# Patient Record
Sex: MALE | Race: WHITE | Employment: STUDENT | URBAN - METROPOLITAN AREA
[De-identification: names, ages, dates, MRNs, and addresses within clinical notes are randomized per-mention and may not be internally consistent; named-entity substitution may affect disease eponyms.]

---
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
## 2020-11-17 ENCOUNTER — TELEPHONE (OUTPATIENT)
Dept: INTERNAL MEDICINE CLINIC | Facility: CLINIC | Age: 20
End: 2020-11-17

## 2020-11-17 ENCOUNTER — HOSPITAL ENCOUNTER (EMERGENCY)
Facility: HOSPITAL | Age: 20
Discharge: HOME | End: 2020-11-17
Attending: EMERGENCY MEDICINE
Payer: COMMERCIAL

## 2020-11-17 ENCOUNTER — APPOINTMENT (OUTPATIENT)
Dept: CT IMAGING | Facility: HOSPITAL | Age: 20
End: 2020-11-17
Attending: EMERGENCY MEDICINE
Payer: COMMERCIAL

## 2020-11-17 VITALS
DIASTOLIC BLOOD PRESSURE: 76 MMHG | TEMPERATURE: 98 F | OXYGEN SATURATION: 97 % | HEART RATE: 75 BPM | RESPIRATION RATE: 18 BRPM | SYSTOLIC BLOOD PRESSURE: 129 MMHG

## 2020-11-17 DIAGNOSIS — R10.9 ABDOMINAL PAIN OF UNKNOWN ETIOLOGY: Primary | ICD-10-CM

## 2020-11-17 PROCEDURE — 36415 COLL VENOUS BLD VENIPUNCTURE: CPT

## 2020-11-17 PROCEDURE — 99284 EMERGENCY DEPT VISIT MOD MDM: CPT

## 2020-11-17 PROCEDURE — 85025 COMPLETE CBC W/AUTO DIFF WBC: CPT | Performed by: EMERGENCY MEDICINE

## 2020-11-17 PROCEDURE — 80053 COMPREHEN METABOLIC PANEL: CPT | Performed by: EMERGENCY MEDICINE

## 2020-11-17 PROCEDURE — 74177 CT ABD & PELVIS W/CONTRAST: CPT | Performed by: EMERGENCY MEDICINE

## 2020-11-17 PROCEDURE — 83690 ASSAY OF LIPASE: CPT | Performed by: EMERGENCY MEDICINE

## 2020-11-17 PROCEDURE — 86140 C-REACTIVE PROTEIN: CPT | Performed by: EMERGENCY MEDICINE

## 2020-11-17 PROCEDURE — 85652 RBC SED RATE AUTOMATED: CPT | Performed by: EMERGENCY MEDICINE

## 2020-11-17 NOTE — TELEPHONE ENCOUNTER
Patient calling to schedule with Dr Lisa Fuchs for ongoing left side abdomen pain that is not going away; possibly kidney stone?  Offered same day with different provider patient declined; please call to triage further if waiting until later in week to be seen i

## 2020-11-17 NOTE — TELEPHONE ENCOUNTER
Spoke with patient stating has had left lower abdominal pain in the past, he was seen at [facility] and then followed up with [facility], CT scan of abdomen was completed at that time.  Patient states his left lower abdominal pain has now been ongoing for the past 2 months

## 2020-11-17 NOTE — ED PROVIDER NOTES
Patient Seen in: [facility] Emergency Department      History   Patient presents with:  Abdomen/Flank Pain    Stated Complaint: left side pain     HPI    This is a 30-year-old male complaining of intermittent left-sided abdominal pain for approximat above.    Physical Exam     ED Triage Vitals [11/17/20 1633]   /76   Pulse 75   Resp 18   Temp 97.6 °F (36.4 °C)   Temp src Temporal   SpO2 97 %   O2 Device None (Room air)       Current:/76   Pulse 75   Temp 97.6 °F (36.4 °C) (Temporal)   Resp -----------         ------                     CBC W/ DIFFERENTIAL[[id number]]                              Final result                 Please view results for these tests on the individual orders.    CBC W/ DIFFERENTIAL       Ct CONCLUSION:  No acute abdominal pelvic process.    Dictated by (CST): Mercy Dyer MD on 11/17/2020 at 5:21 PM     Finalized by (CST): Mercy Dyer MD on 11/17/2020 at 5:33 PM         The patient's history and physical evaluation is consistent with c

## 2020-11-17 NOTE — TELEPHONE ENCOUNTER
Spoke with patient advised to proceed to ER for further evaluation. Patient verbalized understanding and agreeable to POC.

## 2021-05-03 ENCOUNTER — OFFICE VISIT (OUTPATIENT)
Dept: INTERNAL MEDICINE CLINIC | Facility: CLINIC | Age: 21
End: 2021-05-03
Payer: COMMERCIAL

## 2021-05-03 VITALS
TEMPERATURE: 100 F | OXYGEN SATURATION: 99 % | DIASTOLIC BLOOD PRESSURE: 60 MMHG | RESPIRATION RATE: 16 BRPM | HEIGHT: 69 IN | SYSTOLIC BLOOD PRESSURE: 122 MMHG | WEIGHT: 147.25 LBS | BODY MASS INDEX: 21.81 KG/M2 | HEART RATE: 87 BPM

## 2021-05-03 DIAGNOSIS — K64.9 HEMORRHOIDS, UNSPECIFIED HEMORRHOID TYPE: ICD-10-CM

## 2021-05-03 DIAGNOSIS — R35.0 URINE FREQUENCY: Primary | ICD-10-CM

## 2021-05-03 PROCEDURE — 87491 CHLMYD TRACH DNA AMP PROBE: CPT | Performed by: FAMILY MEDICINE

## 2021-05-03 PROCEDURE — 3078F DIAST BP <80 MM HG: CPT | Performed by: FAMILY MEDICINE

## 2021-05-03 PROCEDURE — 3074F SYST BP LT 130 MM HG: CPT | Performed by: FAMILY MEDICINE

## 2021-05-03 PROCEDURE — 99214 OFFICE O/P EST MOD 30 MIN: CPT | Performed by: FAMILY MEDICINE

## 2021-05-03 PROCEDURE — 87591 N.GONORRHOEAE DNA AMP PROB: CPT | Performed by: FAMILY MEDICINE

## 2021-05-03 PROCEDURE — 3008F BODY MASS INDEX DOCD: CPT | Performed by: FAMILY MEDICINE

## 2021-05-03 PROCEDURE — 81003 URINALYSIS AUTO W/O SCOPE: CPT | Performed by: FAMILY MEDICINE

## 2021-05-03 NOTE — PROGRESS NOTES
Arnoldo Cuevas is a 24year old male.   HPI:   Here to go over few issues   Last month had a hard time urinating as it was blocked    Did get better but still has urgency    Urine is clear to ylw       Was told had stones in hte past     Able to sleep at ni

## 2021-05-10 ENCOUNTER — TELEPHONE (OUTPATIENT)
Dept: INTERNAL MEDICINE CLINIC | Facility: CLINIC | Age: 21
End: 2021-05-10

## 2021-05-10 NOTE — TELEPHONE ENCOUNTER
Pt called to get status on urine test results. Advd results were sent via [employer]. Pt reji he still wanted to speak with a nurse. Then pt reji he would call us back after logging into his mychart if he had additional questions.

## 2022-08-15 ENCOUNTER — APPOINTMENT (OUTPATIENT)
Dept: ULTRASOUND IMAGING | Age: 22
End: 2022-08-15
Attending: EMERGENCY MEDICINE
Payer: COMMERCIAL

## 2022-08-15 ENCOUNTER — HOSPITAL ENCOUNTER (OUTPATIENT)
Age: 22
Discharge: HOME | End: 2022-08-15
Attending: EMERGENCY MEDICINE
Payer: COMMERCIAL

## 2022-08-15 VITALS
OXYGEN SATURATION: 99 % | TEMPERATURE: 98 F | RESPIRATION RATE: 16 BRPM | DIASTOLIC BLOOD PRESSURE: 76 MMHG | HEART RATE: 88 BPM | SYSTOLIC BLOOD PRESSURE: 123 MMHG

## 2022-08-15 DIAGNOSIS — N50.811 TESTICULAR PAIN, RIGHT: Primary | ICD-10-CM

## 2022-08-15 LAB
POCT BILIRUBIN URINE: NEGATIVE
POCT GLUCOSE URINE: NEGATIVE MG/DL
POCT KETONE URINE: NEGATIVE MG/DL
POCT LEUKOCYTE ESTERASE URINE: NEGATIVE
POCT NITRITE URINE: NEGATIVE
POCT PH URINE: 7.5 (ref 5–8)
POCT PROTEIN URINE: NEGATIVE MG/DL
POCT SPECIFIC GRAVITY URINE: 1.02
POCT URINE CLARITY: CLEAR
POCT URINE COLOR: YELLOW
POCT UROBILINOGEN URINE: 0.2 MG/DL

## 2022-08-15 PROCEDURE — 81002 URINALYSIS NONAUTO W/O SCOPE: CPT | Performed by: EMERGENCY MEDICINE

## 2022-08-15 PROCEDURE — 76870 US EXAM SCROTUM: CPT | Performed by: EMERGENCY MEDICINE

## 2022-08-15 PROCEDURE — 93975 VASCULAR STUDY: CPT | Performed by: EMERGENCY MEDICINE

## 2022-08-15 PROCEDURE — 87591 N.GONORRHOEAE DNA AMP PROB: CPT | Performed by: EMERGENCY MEDICINE

## 2022-08-15 PROCEDURE — 99213 OFFICE O/P EST LOW 20 MIN: CPT

## 2022-08-15 PROCEDURE — 87491 CHLMYD TRACH DNA AMP PROBE: CPT | Performed by: EMERGENCY MEDICINE

## 2022-08-15 PROCEDURE — 99214 OFFICE O/P EST MOD 30 MIN: CPT

## 2022-08-15 NOTE — ED INITIAL ASSESSMENT (HPI)
For about 2 months off and on pt with \"rising\" R testicle - seems like there is a bump above testicle when this happens and testicle does not descend properly; pt states when he was a child he had surgery for undescended L testicle; \"semen thicker than normal\"    No swelling or discoloration or discharge

## 2022-08-16 ENCOUNTER — OFFICE VISIT (OUTPATIENT)
Dept: SURGERY | Facility: CLINIC | Age: 22
End: 2022-08-16
Payer: COMMERCIAL

## 2022-08-16 DIAGNOSIS — N50.811 PAIN IN RIGHT TESTICLE: ICD-10-CM

## 2022-08-16 DIAGNOSIS — K59.00 CONSTIPATION, UNSPECIFIED CONSTIPATION TYPE: ICD-10-CM

## 2022-08-16 DIAGNOSIS — R82.90 URINE FINDING: Primary | ICD-10-CM

## 2022-08-16 DIAGNOSIS — R39.12 WEAK URINARY STREAM: ICD-10-CM

## 2022-08-16 LAB
APPEARANCE: CLEAR
BILIRUBIN: NEGATIVE
C TRACH DNA SPEC QL NAA+PROBE: NEGATIVE
GLUCOSE (URINE DIPSTICK): NEGATIVE MG/DL
KETONES (URINE DIPSTICK): NEGATIVE MG/DL
LEUKOCYTES: NEGATIVE
MULTISTIX LOT#: NORMAL NUMERIC
N GONORRHOEA DNA SPEC QL NAA+PROBE: NEGATIVE
NITRITE, URINE: NEGATIVE
OCCULT BLOOD: NEGATIVE
PH, URINE: 6 (ref 4.5–8)
PROTEIN (URINE DIPSTICK): NEGATIVE MG/DL
SPECIFIC GRAVITY: 1.03 (ref 1–1.03)
URINE-COLOR: YELLOW
UROBILINOGEN,SEMI-QN: 0.2 MG/DL (ref 0–1.9)

## 2022-08-16 PROCEDURE — 99204 OFFICE O/P NEW MOD 45 MIN: CPT | Performed by: UROLOGY

## 2022-08-16 PROCEDURE — 81003 URINALYSIS AUTO W/O SCOPE: CPT | Performed by: UROLOGY

## 2024-01-25 ENCOUNTER — HOSPITAL ENCOUNTER (OUTPATIENT)
Age: 24
Discharge: HOME | End: 2024-01-25
Payer: COMMERCIAL

## 2024-01-25 ENCOUNTER — APPOINTMENT (OUTPATIENT)
Dept: GENERAL RADIOLOGY | Age: 24
End: 2024-01-25
Attending: PHYSICIAN ASSISTANT
Payer: COMMERCIAL

## 2024-01-25 VITALS
TEMPERATURE: 99 F | WEIGHT: 160 LBS | HEIGHT: 70 IN | OXYGEN SATURATION: 96 % | BODY MASS INDEX: 22.9 KG/M2 | HEART RATE: 81 BPM | RESPIRATION RATE: 16 BRPM | DIASTOLIC BLOOD PRESSURE: 73 MMHG | SYSTOLIC BLOOD PRESSURE: 121 MMHG

## 2024-01-25 DIAGNOSIS — F17.200 HEAVY SMOKER: ICD-10-CM

## 2024-01-25 DIAGNOSIS — R06.09 DYSPNEA ON EXERTION: Primary | ICD-10-CM

## 2024-01-25 DIAGNOSIS — J01.40 ACUTE PANSINUSITIS, RECURRENCE NOT SPECIFIED: ICD-10-CM

## 2024-01-25 LAB
#MXD IC: 0.4 X10ˆ3/UL (ref 0.1–1)
BUN BLD-MCNC: 15 MG/DL (ref 7–18)
CHLORIDE BLD-SCNC: 100 MMOL/L (ref 98–112)
CO2 BLD-SCNC: 25 MMOL/L (ref 21–32)
CREAT BLD-MCNC: 1 MG/DL
DDIMER WHOLE BLOOD: <200 NG/ML DDU (ref ?–400)
EGFRCR SERPLBLD CKD-EPI 2021: 108 ML/MIN/1.73M2 (ref 60–?)
GLUCOSE BLD-MCNC: 95 MG/DL (ref 70–99)
HCT VFR BLD AUTO: 42.9 %
HCT VFR BLD CALC: 43 %
HGB BLD-MCNC: 14.5 G/DL
ISTAT IONIZED CALCIUM FOR CHEM 8: 1.22 MMOL/L (ref 1.12–1.32)
LYMPHOCYTES # BLD AUTO: 2.3 X10ˆ3/UL (ref 1–4)
LYMPHOCYTES NFR BLD AUTO: 34.8 %
MCH RBC QN AUTO: 29.2 PG (ref 26–34)
MCHC RBC AUTO-ENTMCNC: 33.8 G/DL (ref 31–37)
MCV RBC AUTO: 86.3 FL (ref 80–100)
MIXED CELL %: 6.5 %
NEUTROPHILS # BLD AUTO: 4 X10ˆ3/UL (ref 1.5–7.7)
NEUTROPHILS NFR BLD AUTO: 58.7 %
PLATELET # BLD AUTO: 225 X10ˆ3/UL (ref 150–450)
POTASSIUM BLD-SCNC: 3.6 MMOL/L (ref 3.6–5.1)
RBC # BLD AUTO: 4.97 X10ˆ6/UL
SODIUM BLD-SCNC: 138 MMOL/L (ref 136–145)
TROPONIN I BLD-MCNC: <0.02 NG/ML
WBC # BLD AUTO: 6.7 X10ˆ3/UL (ref 4–11)

## 2024-01-25 PROCEDURE — 80047 BASIC METABLC PNL IONIZED CA: CPT

## 2024-01-25 PROCEDURE — 94640 AIRWAY INHALATION TREATMENT: CPT

## 2024-01-25 PROCEDURE — 84484 ASSAY OF TROPONIN QUANT: CPT

## 2024-01-25 PROCEDURE — 71046 X-RAY EXAM CHEST 2 VIEWS: CPT | Performed by: PHYSICIAN ASSISTANT

## 2024-01-25 PROCEDURE — 93010 ELECTROCARDIOGRAM REPORT: CPT

## 2024-01-25 PROCEDURE — 85378 FIBRIN DEGRADE SEMIQUANT: CPT | Performed by: PHYSICIAN ASSISTANT

## 2024-01-25 PROCEDURE — 93005 ELECTROCARDIOGRAM TRACING: CPT

## 2024-01-25 PROCEDURE — 85025 COMPLETE CBC W/AUTO DIFF WBC: CPT | Performed by: PHYSICIAN ASSISTANT

## 2024-01-25 PROCEDURE — 99215 OFFICE O/P EST HI 40 MIN: CPT

## 2024-01-25 PROCEDURE — 36415 COLL VENOUS BLD VENIPUNCTURE: CPT

## 2024-01-25 RX ORDER — AMOXICILLIN 875 MG/1
875 TABLET, COATED ORAL 2 TIMES DAILY
Qty: 20 TABLET | Refills: 0 | Status: SHIPPED | OUTPATIENT
Start: 2024-01-25 | End: 2024-02-04

## 2024-01-25 RX ORDER — PREDNISONE 20 MG/1
40 TABLET ORAL DAILY
Qty: 8 TABLET | Refills: 0 | Status: SHIPPED | OUTPATIENT
Start: 2024-01-25 | End: 2024-01-29

## 2024-01-25 RX ORDER — ALBUTEROL SULFATE 90 UG/1
2 AEROSOL, METERED RESPIRATORY (INHALATION) ONCE
Status: COMPLETED | OUTPATIENT
Start: 2024-01-25 | End: 2024-01-25

## 2024-01-25 NOTE — ED PROVIDER NOTES
Patient Seen in: [facility]      History     Chief Complaint   Patient presents with    Cough/URI     Stated Complaint: Cough/URI, Ear Issue, Eye Issue    Subjective:   HPI    23-year-old male here with complaint of sinus pain and pressure for the past month.  Patient reports in the past year he has been having some dyspnea on exertion.  Patient is a heavy smoker both cigarettes and marijuana and vaping.  Patient denies chest pain at this time but has had intermittent chest discomfort with deep breaths.  Patient denies abdominal pain, nausea, vomiting or diarrhea.  Patient is tolerating p.o. speaking full sentences.  Afebrile.    Objective:   History reviewed. No pertinent past medical history.           Past Surgical History:   Procedure Laterality Date    OTHER SURGICAL HISTORY      left undescended testes sx, lowered         11yr age       OTHER SURGICAL HISTORY      hemorrhoid clot removed                The patient's medication list, past medical history and social history elements  as listed in today's nurse's notes are reviewed and agree.   The patient's family history is reviewed and is noncontributory to the presenting problem, except as indicated as above.     Social History     Socioeconomic History    Marital status: Single   Tobacco Use    Smoking status: Every Day     Types: Cigarettes    Smokeless tobacco: Never    Tobacco comments:     Smokes 4 cigs per day   Vaping Use    Vaping Use: Every day    Devices: Disposable   Substance and Sexual Activity    Alcohol use: Yes     Comment: on the weekends    Drug use: Yes     Types: Cannabis    Sexual activity: Yes     Partners: Female     Birth control/protection: Condom   Other Topics Concern    Caffeine Concern Yes     Comment: Drinks a lot of pop, sometimes coffee and 5-hour Energy.      Exercise No              Review of Systems    Positive for stated complaint: Cough/URI, Ear Issue, Eye Issue  Other systems are as noted in  HPI.  Constitutional and vital signs reviewed.      All other systems reviewed and negative except as noted above.    Physical Exam     ED Triage Vitals [01/25/24 1604]   /73   Pulse 81   Resp 16   Temp 98.5 °F (36.9 °C)   Temp src Temporal   SpO2 96 %   O2 Device None (Room air)       Current:/73   Pulse 81   Temp 98.5 °F (36.9 °C) (Temporal)   Resp 16   Ht 177.8 cm (5' 10\")   Wt 72.6 kg   SpO2 96%   BMI 22.96 kg/m²         Physical Exam  Vitals and nursing note reviewed.   Constitutional:       Appearance: Normal appearance. He is well-developed.   HENT:      Head: Normocephalic.      Jaw: There is normal jaw occlusion.      Right Ear: External ear normal. Tympanic membrane is bulging.      Left Ear: External ear normal. Tympanic membrane is bulging.      Nose: Mucosal edema, congestion and rhinorrhea present. Rhinorrhea is clear and purulent.      Right Sinus: Maxillary sinus tenderness and frontal sinus tenderness present.      Left Sinus: Maxillary sinus tenderness and frontal sinus tenderness present.      Mouth/Throat:      Lips: Pink.      Mouth: Mucous membranes are moist.      Pharynx: Oropharynx is clear.      Comments: Uvula midline: No trismus or drooling: No peritonsillar abscess noted moderate cobblestoning of the posterior pharynx.  Eyes:      Conjunctiva/sclera: Conjunctivae normal.      Pupils: Pupils are equal, round, and reactive to light.   Cardiovascular:      Rate and Rhythm: Normal rate and regular rhythm.      Heart sounds: Normal heart sounds.   Pulmonary:      Effort: Pulmonary effort is normal.      Breath sounds: Wheezing present.   Musculoskeletal:      Cervical back: Normal range of motion and neck supple.   Skin:     General: Skin is warm.      Capillary Refill: Capillary refill takes less than 2 seconds.   Neurological:      General: No focal deficit present.      Mental Status: He is alert and oriented to person, place, and time.   Psychiatric:         Mood and  Affect: Mood normal.         Behavior: Behavior normal.         Thought Content: Thought content normal.         Judgment: Judgment normal.             ED Course     Labs Reviewed   D-DIMER (POC) - Normal   POCT ISTAT CHEM8 CARTRIDGE - Normal   ISTAT TROPONIN - Normal   POCT CBC     EKG    Rate, intervals and axes as noted on EKG Report.  Rate: 63 BPM  Rhythm: normal sinus rhythm  Reading: Incomplete RBBB:borderline EKG           I personally reviewed the xray images and and saw these findings: no acute findings  XR CHEST PA + LAT CHEST (CPT=71046)    Result Date: 1/25/2024  PROCEDURE:  XR CHEST PA + LAT CHEST (CPT=71046)  INDICATIONS:  Cough/URI, Ear Issue, Eye Issue  COMPARISON:  None.  TECHNIQUE:  PA and lateral chest radiographs were obtained.  PATIENT STATED HISTORY: (As transcribed by Technologist)  Patient states chest tightness, SOB and ear pressure for the past past month and a half since sinus infection that has cleared. Current smoker.               CONCLUSION:   Normal cardiac and mediastinal contours.  No pulmonary edema or focal airspace consolidation.  The pleural spaces are clear.  Regional osseous structures are normal.    LOCATION:  [facility]   Dictated by (CST): Karena Robledo MD on 1/25/2024 at 4:44 PM     Finalized by (CST): Karena Robledo MD on 1/25/2024 at 4:44 PM             NOTE: We discussed the fact that with his vaping marijuana and cigarette smoking simultaneously etc. it is attributing to his issues.  Discussed follow-up with his PCP to start a regime to help stop smoking i.e. Chantix etc.  Patient is amenable.         MDM       Clinical Impression: dyspnea on exertion/sinusitis/heavy smoker  Course of Treatment:     The Decadron will work in your system the next several days.  You may start the additional prednisone on day 2 or 3 if symptoms persist.  Take the full course antibiotics as prescribed in tandem with a probiotic daily.  Use your inhaler every 4-6 hours as needed.  Recommend taking  an over the counter antihistamine daily: IE zyrtec/claritin.  Sleep more upright. Use chloraseptic spray to help stop the cough trigger reflex.  Push fluids and gargle with warm saline rinses.   Work on smoking cessation as discussed.  If symptoms persist or worsen i.e. sustained shortness of breath chest discomfort etc. dial 911 and go directly to the emergency room.  Otherwise close follow-up with your primary care physician for further evaluation and treatment.  This case was discussed with the attending physician please see the attestation.     The patient is encouraged to return if any concerning symptoms arise. Additional verbal discharge instructions are given and discussed. Discharge medications are discussed. The patient is in good condition throughout the visit today and remains so upon discharge. I discuss the plan of care with the patient, who expresses understanding. All questions and concerns are addressed to the patient's satisfaction prior to discharge today.  Previous conversations with PCP and charts were reviewed.                                       Disposition and Plan     Clinical Impression:  1. Dyspnea on exertion    2. Acute pansinusitis, recurrence not specified    3. Heavy smoker         Disposition:  Discharge  1/25/2024  5:17 pm    Follow-up:  Saud Barbosa MD  [address] [address]  [phone]                Medications Prescribed:  Current Discharge Medication List        START taking these medications    Details   amoxicillin 875 MG Oral Tab Take 1 tablet (875 mg total) by mouth 2 (two) times daily for 10 days.  Qty: 20 tablet, Refills: 0      predniSONE 20 MG Oral Tab Take 2 tablets (40 mg total) by mouth daily for 4 days. Start on day 2-3 if symptoms persist  Qty: 8 tablet, Refills: 0

## 2024-01-25 NOTE — DISCHARGE INSTRUCTIONS
Please return to the ER/clinic if symptoms worsen. Follow-up with your PCP in 24-48 hours as needed.      The Decadron will work in your system the next several days.  You may start the additional prednisone on day 2 or 3 if symptoms persist.  Take the full course antibiotics as prescribed in tandem with a probiotic daily.  Use your inhaler every 4-6 hours as needed.  Recommend taking an over the counter antihistamine daily: IE zyrtec/claritin.  Sleep more upright. Use chloraseptic spray to help stop the cough trigger reflex.  Push fluids and gargle with warm saline rinses.   Work on smoking cessation as discussed.  If symptoms persist or worsen i.e. sustained shortness of breath chest discomfort etc. dial 911 and go directly to the emergency room.  Otherwise close follow-up with your primary care physician for further evaluation and treatment.

## 2024-01-26 LAB
ATRIAL RATE: 63 BPM
P AXIS: 65 DEGREES
P-R INTERVAL: 150 MS
Q-T INTERVAL: 358 MS
QRS DURATION: 104 MS
QTC CALCULATION (BEZET): 366 MS
R AXIS: 52 DEGREES
T AXIS: 39 DEGREES
VENTRICULAR RATE: 63 BPM

## 2024-06-06 ENCOUNTER — APPOINTMENT (OUTPATIENT)
Dept: GENERAL RADIOLOGY | Age: 24
End: 2024-06-06
Attending: EMERGENCY MEDICINE
Payer: COMMERCIAL

## 2024-06-06 ENCOUNTER — HOSPITAL ENCOUNTER (OUTPATIENT)
Age: 24
Discharge: HOME | End: 2024-06-06
Attending: EMERGENCY MEDICINE
Payer: COMMERCIAL

## 2024-06-06 VITALS
TEMPERATURE: 98 F | HEART RATE: 73 BPM | SYSTOLIC BLOOD PRESSURE: 131 MMHG | OXYGEN SATURATION: 97 % | DIASTOLIC BLOOD PRESSURE: 72 MMHG | RESPIRATION RATE: 16 BRPM

## 2024-06-06 DIAGNOSIS — K30 INDIGESTION: Primary | ICD-10-CM

## 2024-06-06 DIAGNOSIS — K59.00 CONSTIPATION, UNSPECIFIED CONSTIPATION TYPE: ICD-10-CM

## 2024-06-06 LAB
BILIRUB UR QL STRIP: NEGATIVE
CLARITY UR: CLEAR
COLOR UR: YELLOW
GLUCOSE UR STRIP-MCNC: NEGATIVE MG/DL
HGB UR QL STRIP: NEGATIVE
KETONES UR STRIP-MCNC: NEGATIVE MG/DL
LEUKOCYTE ESTERASE UR QL STRIP: NEGATIVE
NITRITE UR QL STRIP: NEGATIVE
PH UR STRIP: 6 [PH]
SP GR UR STRIP: >=1.03
UROBILINOGEN UR STRIP-ACNC: <2 MG/DL

## 2024-06-06 PROCEDURE — 74021 RADEX ABDOMEN 3+ VIEWS: CPT | Performed by: EMERGENCY MEDICINE

## 2024-06-06 PROCEDURE — 99213 OFFICE O/P EST LOW 20 MIN: CPT

## 2024-06-06 PROCEDURE — 81002 URINALYSIS NONAUTO W/O SCOPE: CPT

## 2024-06-06 NOTE — ED PROVIDER NOTES
Patient reflex  Patient Seen in: [facility]      History     Chief Complaint   Patient presents with    Abdominal Pain    Chest Pain    Urinary Symptoms     Stated Complaint: abdominal pain    Subjective:   HPI    For the past 2 weeks patient has been having intermittent left lower quadrant pain, sometimes back pain, says that his cremasteric reflex is been very strong on the right side, feels he has pelvic floor issues, foul-smelling urine at times.  Objective:   History reviewed. No pertinent past medical history.           Past Surgical History:   Procedure Laterality Date    Other surgical history      left undescended testes sx, lowered         11yr age       Other surgical history      hemorrhoid clot removed                  Social History     Socioeconomic History    Marital status: Single   Tobacco Use    Smoking status: Every Day     Types: Cigarettes    Smokeless tobacco: Never    Tobacco comments:     Smokes 4 cigs per day   Vaping Use    Vaping status: Every Day    Devices: Disposable   Substance and Sexual Activity    Alcohol use: Yes     Comment: on the weekends    Drug use: Yes     Types: Cannabis    Sexual activity: Yes     Partners: Female     Birth control/protection: Condom   Other Topics Concern    Caffeine Concern Yes     Comment: Drinks a lot of pop, sometimes coffee and 5-hour Energy.      Exercise No     Social Determinants of Health      Received from [facility]              Review of Systems    Positive for stated complaint: abdominal pain  Other systems are as noted in HPI.  Constitutional and vital signs reviewed.      All other systems reviewed and negative except as noted above.    Physical Exam     ED Triage Vitals [06/06/24 1052]   /72   Pulse 73   Resp 16   Temp 98 °F (36.7 °C)   Temp src Temporal   SpO2 97 %   O2 Device None (Room air)       Current Vitals:   Vital Signs  BP: 131/72  Pulse: 73  Resp: 16  Temp: 98 °F (36.7 °C)  Temp src:  Temporal    Oxygen Therapy  SpO2: 97 %  O2 Device: None (Room air)            Physical Exam    General: Well-developed, well-nourished, comfortable, no acute distress  Head and neck: Normocephalic, atraumatic  Cardiovascular: Regular rate and rhythm, normal S1 and S2, no obvious murmurs, rubs, or gallops   Lungs: Clear to auscultation bilaterally with equal breath sounds, no wheezing, no rhonchi   Abdomen: Positive bowel sounds,  soft, no tenderness, no distension, no rebound, no guarding   Extremities: No cyanosis, no clubbing, no edema   Musculoskeletal: No tenderness, swelling, deformity   Skin: No significant lesions   Neuro: Grossly intact to patient's baseline  : Exam deferred per patient's wishes    ED Course     Labs Reviewed   [facility] POCT URINALYSIS DIPSTICK - Abnormal; Notable for the following components:       Result Value    Protein urine Trace (*)     All other components within normal limits             Obstructive series -no obstruction.  Stool in ascending colon         MDM      24-year-old no significant past medical history says for the past 2 weeks he has been having intermittent left lower quadrant pain, change in urination, strong cremasteric reflex, pelvic floor issues, poor digestion, stress at work,.  Patient admits he has never eaten any vegetables for most of his life.  He eats [employer] quite often.  Urine dip and obstructive series are reassuring.   We discussed lifestyle modifications.  Patient does not appear to have any emergent issues at this point.  Aftercare instructions reviewed all questions answered                                 Medical Decision Making      Disposition and Plan     Clinical Impression:  1. Indigestion    2. Constipation, unspecified constipation type         Disposition:  Discharge  6/6/2024 12:34 pm    Follow-up:  Saud Barbosa MD  [address] [facility] [address]  [facility] [address]  [phone]                Medications Prescribed:  There are no discharge  medications for this patient.

## 2024-06-06 NOTE — ED INITIAL ASSESSMENT (HPI)
C/o left abdominal pain for 2 weeks, bloating and constipated. A lot of ingestion. Frequency urination for more than 2 weeks. Tightness in the chest when breathing 2-3 weeks. Sinus infection 2 months ago complete antibiotic but not completely better. Left chest pain this morning after eating lasted 2-3 minutes after

## 2024-06-06 NOTE — DISCHARGE INSTRUCTIONS
Drink lots of water.  Try to start eating lots of high-fiber foods - vegetables, fruits, whole grains.  Avoid sugars and starches  Avoid processed foods  Consider a fiber supplement.  Use an over-the-counter laxative like magnesium hydroxide or MiraLAX as needed.  These are only a temporary fix.  You start eating healthy.  Avoid cigarettes, vaping, excess alcohol exposure  Return for any problems

## 2024-06-12 ENCOUNTER — PATIENT MESSAGE (OUTPATIENT)
Dept: INTERNAL MEDICINE CLINIC | Facility: CLINIC | Age: 24
End: 2024-06-12

## 2024-06-12 NOTE — TELEPHONE ENCOUNTER
From: Gbariel Toscano  Sent: 6/12/2024 10:08 AM CDT  To: [facility] Clinical Staff  Subject: Insurance    Okay

## 2024-06-20 ENCOUNTER — OFFICE VISIT (OUTPATIENT)
Dept: INTERNAL MEDICINE CLINIC | Facility: CLINIC | Age: 24
End: 2024-06-20

## 2024-06-20 VITALS
BODY MASS INDEX: 21.72 KG/M2 | HEART RATE: 95 BPM | WEIGHT: 146.63 LBS | SYSTOLIC BLOOD PRESSURE: 122 MMHG | OXYGEN SATURATION: 99 % | TEMPERATURE: 98 F | DIASTOLIC BLOOD PRESSURE: 80 MMHG | HEIGHT: 69 IN

## 2024-06-20 DIAGNOSIS — F17.200 SMOKER: ICD-10-CM

## 2024-06-20 DIAGNOSIS — F41.0 PANIC ATTACK: ICD-10-CM

## 2024-06-20 DIAGNOSIS — R10.32 LLQ PAIN: ICD-10-CM

## 2024-06-20 DIAGNOSIS — Z00.00 LABORATORY EXAM ORDERED AS PART OF ROUTINE GENERAL MEDICAL EXAMINATION: ICD-10-CM

## 2024-06-20 DIAGNOSIS — Z00.00 WELLNESS EXAMINATION: Primary | ICD-10-CM

## 2024-06-20 PROBLEM — K21.9 GERD (GASTROESOPHAGEAL REFLUX DISEASE): Status: ACTIVE | Noted: 2024-06-20

## 2024-06-20 PROCEDURE — 99202 OFFICE O/P NEW SF 15 MIN: CPT | Performed by: FAMILY MEDICINE

## 2024-06-20 PROCEDURE — 99385 PREV VISIT NEW AGE 18-39: CPT | Performed by: FAMILY MEDICINE

## 2024-06-20 NOTE — PROGRESS NOTES
Gabriel Toscano is a 24 year old male who presents for a complete physical exam.   HPI:       Wt Readings from Last 6 Encounters:   06/20/24 146 lb 9.6 oz (66.5 kg)   01/25/24 160 lb (72.6 kg)   05/03/21 147 lb 4 oz (66.8 kg)   09/30/19 142 lb (64.4 kg) (29%, Z= -0.55)*   08/19/19 130 lb (59 kg) (12%, Z= -1.16)*   01/21/19 129 lb (58.5 kg) (13%, Z= -1.12)*     * Growth percentiles are based on CDC (Boys, 2-20 Years) data.     Body mass index is 21.65 kg/m².     AST (U/L)   Date Value   11/17/2020 16     ALT (U/L)   Date Value   11/17/2020 20      No current outpatient medications on file.      No past medical history on file.   Past Surgical History:   Procedure Laterality Date    Other surgical history      left undescended testes sx, lowered         11yr age       Other surgical history      hemorrhoid clot removed        No family history on file.   Social History:  Social History     Socioeconomic History    Marital status: Single   Tobacco Use    Smoking status: Every Day     Types: Cigarettes    Smokeless tobacco: Never    Tobacco comments:     Smokes 4 cigs per day   Vaping Use    Vaping status: Every Day    Devices: Disposable   Substance and Sexual Activity    Alcohol use: Yes     Comment: on the weekends    Drug use: Yes     Types: Cannabis    Sexual activity: Yes     Partners: Female     Birth control/protection: Condom   Other Topics Concern    Caffeine Concern Yes     Comment: Drinks a lot of pop, sometimes coffee and 5-hour Energy.      Exercise No     Social Determinants of Health      Received from [facility]      .        REVIEW OF SYSTEMS:   GENERAL: feels well otherwise  SKIN: denies rash  HEENT: denies nasal congestion, sinus pain or ST  LUNGS: denies shortness of breath   does smoke cig and weed      CARDIOVASCULAR: denies chest pain on exertion  GI: denies abdominal pain   was in ER few d ago for LLQ pain    had xray   advised to use fiber for constipation      : denies  dysuria  NEURO: denies headaches  PSYCHE: getting  in Oct    stress w work, finances    had lost touch w God so coped w week,      was thinking about dieing but he is happy w his dog and fiance     had a panic attack 1 week   heart was racing , like going to break sown   sweating   hard to breathe    got better after 20min to his norm      is OK now    willing to see therpy     HEMATOLOGIC: denies hx of anemia  ENDOCRINE: denies thyroid history  ALL/ASTHMA: denies asthma    EXAM:   /80 (BP Location: Left arm, Patient Position: Sitting, Cuff Size: adult)   Pulse 95   Temp 97.8 °F (36.6 °C) (Temporal)   Ht 5' 9\" (1.753 m)   Wt 146 lb 9.6 oz (66.5 kg)   SpO2 99%   BMI 21.65 kg/m²   Body mass index is 21.65 kg/m².   GENERAL: well developed, well nourished,in no apparent distress  SKIN: no rashes,  HEENT: atraumatic, normocephalic,ears and throat are clear  NECK: supple,no adenopathy  LUNGS: clear to auscultation  CARDIO: RRR without murmur  GI: good BS's,no masses, HSM or tenderness  : two descended testes,no masses,no hernia,no penile lesions  EXTREMITIES: no edema  NEURO: motor and sensory are grossly intact    ASSESSMENT AND PLAN:   Gabriel Toscano is a 24 year old male who presents for a complete physical exam.  Health maintenance, will check fasting Lipids, CMP, CBC and UA.  Discussed smoking    discussed panic dx and options to treat   referral to [facility] ordered      consider meds      discussed consipation     agree w fiber    reviewed ER noted    to check on vaccines     The patient indicates understanding of these issues and agrees to the plan.  The patient is asked to return for CPX in 1yr.

## 2024-06-26 ENCOUNTER — TELEPHONE (OUTPATIENT)
Dept: INTERNAL MEDICINE CLINIC | Facility: CLINIC | Age: 24
End: 2024-06-26

## 2024-06-26 NOTE — TELEPHONE ENCOUNTER
Patient called to check on update. States at last appointment with Dr. Barbosa on 6/20/24 he was informed he would receive a call from [facility]. Patient states he did call and leave a voicemail but has not gotten a call back. Patient is looking to schedule appointment.   Please call and advise.

## 2024-07-04 ENCOUNTER — HOSPITAL ENCOUNTER (EMERGENCY)
Age: 24
Discharge: HOME | End: 2024-07-04
Attending: STUDENT IN AN ORGANIZED HEALTH CARE EDUCATION/TRAINING PROGRAM

## 2024-07-04 VITALS
WEIGHT: 128.97 LBS | TEMPERATURE: 98 F | RESPIRATION RATE: 18 BRPM | OXYGEN SATURATION: 100 % | HEART RATE: 61 BPM | SYSTOLIC BLOOD PRESSURE: 113 MMHG | DIASTOLIC BLOOD PRESSURE: 64 MMHG

## 2024-07-04 DIAGNOSIS — R10.13 EPIGASTRIC PAIN: Primary | ICD-10-CM

## 2024-07-04 LAB
ALBUMIN SERPL-MCNC: 4.6 G/DL (ref 3.6–5.1)
ALBUMIN/GLOB SERPL: 1.6 {RATIO} (ref 1–2.4)
ALP SERPL-CCNC: 72 UNITS/L (ref 45–117)
ALT SERPL-CCNC: 25 UNITS/L
ANION GAP SERPL CALC-SCNC: 10 MMOL/L (ref 7–19)
AST SERPL-CCNC: 13 UNITS/L
BASOPHILS # BLD: 0 K/MCL (ref 0–0.3)
BASOPHILS NFR BLD: 0 %
BILIRUB SERPL-MCNC: 1 MG/DL (ref 0.2–1)
BUN SERPL-MCNC: 14 MG/DL (ref 6–20)
BUN/CREAT SERPL: 16 (ref 7–25)
CALCIUM SERPL-MCNC: 9.8 MG/DL (ref 8.4–10.2)
CHLORIDE SERPL-SCNC: 104 MMOL/L (ref 97–110)
CO2 SERPL-SCNC: 27 MMOL/L (ref 21–32)
CREAT SERPL-MCNC: 0.9 MG/DL (ref 0.67–1.17)
DEPRECATED RDW RBC: 39.8 FL (ref 39–50)
EGFRCR SERPLBLD CKD-EPI 2021: >90 ML/MIN/{1.73_M2}
EOSINOPHIL # BLD: 0.1 K/MCL (ref 0–0.5)
EOSINOPHIL NFR BLD: 1 %
ERYTHROCYTE [DISTWIDTH] IN BLOOD: 12.3 % (ref 11–15)
FASTING DURATION TIME PATIENT: ABNORMAL H
GLOBULIN SER-MCNC: 2.9 G/DL (ref 2–4)
GLUCOSE SERPL-MCNC: 161 MG/DL (ref 70–99)
HCT VFR BLD CALC: 44.8 % (ref 39–51)
HGB BLD-MCNC: 14.9 G/DL (ref 13–17)
IMM GRANULOCYTES # BLD AUTO: 0.1 K/MCL (ref 0–0.2)
IMM GRANULOCYTES # BLD: 1 %
LIPASE SERPL-CCNC: 25 UNITS/L (ref 15–77)
LYMPHOCYTES # BLD: 3.1 K/MCL (ref 1–4.8)
LYMPHOCYTES NFR BLD: 26 %
MCH RBC QN AUTO: 29.4 PG (ref 26–34)
MCHC RBC AUTO-ENTMCNC: 33.3 G/DL (ref 32–36.5)
MCV RBC AUTO: 88.4 FL (ref 78–100)
MONOCYTES # BLD: 0.7 K/MCL (ref 0.3–0.9)
MONOCYTES NFR BLD: 6 %
NEUTROPHILS # BLD: 8 K/MCL (ref 1.8–7.7)
NEUTROPHILS NFR BLD: 66 %
NRBC BLD MANUAL-RTO: 0 /100 WBC
PLATELET # BLD AUTO: 281 K/MCL (ref 140–450)
POTASSIUM SERPL-SCNC: 3.3 MMOL/L (ref 3.4–5.1)
PROT SERPL-MCNC: 7.5 G/DL (ref 6.4–8.2)
RBC # BLD: 5.07 MIL/MCL (ref 4.5–5.9)
SODIUM SERPL-SCNC: 138 MMOL/L (ref 135–145)
WBC # BLD: 11.9 K/MCL (ref 4.2–11)

## 2024-07-04 PROCEDURE — 96361 HYDRATE IV INFUSION ADD-ON: CPT

## 2024-07-04 PROCEDURE — 10002800 HB RX 250 W HCPCS: Performed by: STUDENT IN AN ORGANIZED HEALTH CARE EDUCATION/TRAINING PROGRAM

## 2024-07-04 PROCEDURE — 83690 ASSAY OF LIPASE: CPT | Performed by: STUDENT IN AN ORGANIZED HEALTH CARE EDUCATION/TRAINING PROGRAM

## 2024-07-04 PROCEDURE — 96374 THER/PROPH/DIAG INJ IV PUSH: CPT

## 2024-07-04 PROCEDURE — 10002807 HB RX 258: Performed by: STUDENT IN AN ORGANIZED HEALTH CARE EDUCATION/TRAINING PROGRAM

## 2024-07-04 PROCEDURE — 80053 COMPREHEN METABOLIC PANEL: CPT | Performed by: STUDENT IN AN ORGANIZED HEALTH CARE EDUCATION/TRAINING PROGRAM

## 2024-07-04 PROCEDURE — 99284 EMERGENCY DEPT VISIT MOD MDM: CPT

## 2024-07-04 PROCEDURE — 85025 COMPLETE CBC W/AUTO DIFF WBC: CPT | Performed by: STUDENT IN AN ORGANIZED HEALTH CARE EDUCATION/TRAINING PROGRAM

## 2024-07-04 RX ORDER — DROPERIDOL 2.5 MG/ML
1.25 INJECTION, SOLUTION INTRAMUSCULAR; INTRAVENOUS ONCE
Status: COMPLETED | OUTPATIENT
Start: 2024-07-04 | End: 2024-07-04

## 2024-07-04 RX ADMIN — SODIUM CHLORIDE 1000 ML: 9 INJECTION, SOLUTION INTRAVENOUS at 16:51

## 2024-07-04 RX ADMIN — DROPERIDOL 1.25 MG: 2.5 INJECTION, SOLUTION INTRAMUSCULAR; INTRAVENOUS at 16:51

## 2024-07-04 ASSESSMENT — ENCOUNTER SYMPTOMS
VOMITING: 1
ABDOMINAL PAIN: 1
NAUSEA: 1

## 2024-07-04 ASSESSMENT — PAIN SCALES - GENERAL: PAINLEVEL_OUTOF10: 9

## 2024-07-05 LAB
RAINBOW EXTRA TUBES HOLD SPECIMEN: NORMAL

## 2025-05-14 ENCOUNTER — HOSPITAL ENCOUNTER (OUTPATIENT)
Age: 25
Discharge: HOME | End: 2025-05-14
Attending: EMERGENCY MEDICINE
Payer: COMMERCIAL

## 2025-05-14 VITALS
RESPIRATION RATE: 18 BRPM | BODY MASS INDEX: 23 KG/M2 | WEIGHT: 155 LBS | HEART RATE: 100 BPM | SYSTOLIC BLOOD PRESSURE: 138 MMHG | TEMPERATURE: 98 F | DIASTOLIC BLOOD PRESSURE: 76 MMHG | OXYGEN SATURATION: 98 %

## 2025-05-14 DIAGNOSIS — R59.9 SWOLLEN LYMPH NODES: Primary | ICD-10-CM

## 2025-05-14 LAB
BILIRUB UR QL STRIP: NEGATIVE
CLARITY UR: CLEAR
COLOR UR: YELLOW
GLUCOSE UR STRIP-MCNC: NEGATIVE MG/DL
HGB UR QL STRIP: NEGATIVE
KETONES UR STRIP-MCNC: NEGATIVE MG/DL
LEUKOCYTE ESTERASE UR QL STRIP: NEGATIVE
NITRITE UR QL STRIP: NEGATIVE
PH UR STRIP: 6.5 [PH]
PROT UR STRIP-MCNC: NEGATIVE MG/DL
SP GR UR STRIP: 1.02
UROBILINOGEN UR STRIP-ACNC: <2 MG/DL

## 2025-05-14 PROCEDURE — 99212 OFFICE O/P EST SF 10 MIN: CPT

## 2025-05-14 PROCEDURE — 81002 URINALYSIS NONAUTO W/O SCOPE: CPT

## 2025-05-14 NOTE — ED INITIAL ASSESSMENT (HPI)
Pt c/o swollen gland to R pelvic area noting it 2 days ago, pt had an episode of pain with urination and urgency 1 week ago, pt took a home ua test with neg result, no s/s since

## 2025-05-14 NOTE — ED PROVIDER NOTES
Patient Seen in: [facility]      History     Chief Complaint   Patient presents with    Lump Mass     Stated Complaint: eval g    Subjective:   HPI  History of Present Illness            Patient is a 25-year-old male who states that for the last week or so he has noticed a swollen lymph node in his right inguinal region.  Patient states he did have an episode of dysuria within the last couple weeks.  Patient denies  any abdominal pain, no definite weight loss.  No night sweats.  No vomiting diarrhea.  No hematuria or dysuria currently.  Patient states he has a family history of cancer and was concerned.  Remainder of review of systems negative.      Objective:     History reviewed. No pertinent past medical history.           Past Surgical History:   Procedure Laterality Date    Other surgical history      left undescended testes sx, lowered         11yr age       Other surgical history      hemorrhoid clot removed                  Social History     Socioeconomic History    Marital status: Single   Tobacco Use    Smoking status: Every Day     Types: Cigarettes    Smokeless tobacco: Never    Tobacco comments:     Smokes 4 cigs per day   Vaping Use    Vaping status: Every Day    Devices: Disposable   Substance and Sexual Activity    Alcohol use: Yes     Comment: on the weekends    Drug use: Yes     Types: Cannabis    Sexual activity: Yes     Partners: Female     Birth control/protection: Condom   Other Topics Concern    Caffeine Concern Yes     Comment: Drinks a lot of pop, sometimes coffee and 5-hour Energy.      Exercise No     Social Drivers of Health      Received from [facility]              Review of Systems    Positive for stated complaint: eval g  Other systems are as noted in HPI.  Constitutional and vital signs reviewed.      All other systems reviewed and negative except as noted above.                  Physical Exam     ED Triage Vitals [05/14/25 1034]   /76    Pulse 100   Resp 18   Temp 98.1 °F (36.7 °C)   Temp src Oral   SpO2 98 %   O2 Device None (Room air)       Current Vitals:   Vital Signs  BP: 138/76  Pulse: 100  Resp: 18  Temp: 98.1 °F (36.7 °C)  Temp src: Oral    Oxygen Therapy  SpO2: 98 %  O2 Device: None (Room air)          Physical Exam   GENERAL: Patient resting comfortably on the cart in no acute distress.  HEENT: Extraocular muscles intact, pupils equal round reactive to light and accommodation.  Mouth normal, neck supple, no meningismus.  No cervical lymphadenopathy.  LUNGS: Lungs clear to auscultation bilaterally.  CARDIOVASCULAR: + S1-S2, regular rate and rhythm, no murmurs.  BACK: No CVA tenderness, no midline bony tenderness.  ABDOMEN: + Bowel sounds, soft, nontender, nondistended.  No rebound, no guarding, no hepatosplenomegaly.  Genital exam testicles descended nontender.  Patient has a less than 1 cm mobile lymph node right and 2 on the left.  Inguinal region.  EXTREMITIES: Full range of motion, no tenderness, good capillary refill.  SKIN: No rash, good turgor.  NEURO: Patient answers questions appropriately.  No focal deficits appreciated.              ED Course     Labs Reviewed   [facility] POCT URINALYSIS DIPSTICK          Results                                MDM      Patient urinalysis negative.  Patient lymph nodes are small recommend follow-up further evaluation primary physician.  Return if new or worse symptoms.  Did consider lymphadenopathy, UTI, mass.        Medical Decision Making        Disposition and Plan     Clinical Impression:  1. Swollen lymph nodes         Disposition:  Discharge  5/14/2025 11:58 am    Follow-up:  Saud Barbosa MD  [address] [address]  [phone]    In 1 week            Medications Prescribed:  There are no discharge medications for this patient.            Supplementary Documentation:

## (undated) NOTE — LETTER
[facility] CARE IN [address]  Dept: [phone]  Dept Fax: [phone]         January 31, 2018    Patient: Gayle Dates   YOB: 2000   Date of Visit: 1/31/2018       To Whom It May Concern:

## (undated) NOTE — LETTER
[facility] CARE IN [address]  Dept: [phone]  Dept Fax: [phone]         January 31, 2018    Patient: Marcelo Allen   YOB: 2000   Date of Visit: 1/31/2018       To Whom It May Concern:

## (undated) NOTE — Clinical Note
Dear Dr. Angie Carrera,       Thank you for referring Marisa Tolliver to the [facility].   Sincerely,  LUNA Farrell